# Patient Record
Sex: MALE | Race: WHITE
[De-identification: names, ages, dates, MRNs, and addresses within clinical notes are randomized per-mention and may not be internally consistent; named-entity substitution may affect disease eponyms.]

---

## 2017-02-21 ENCOUNTER — HOSPITAL ENCOUNTER (EMERGENCY)
Dept: HOSPITAL 47 - EC | Age: 42
Discharge: HOME | End: 2017-02-21
Payer: COMMERCIAL

## 2017-02-21 VITALS
HEART RATE: 70 BPM | SYSTOLIC BLOOD PRESSURE: 124 MMHG | RESPIRATION RATE: 20 BRPM | TEMPERATURE: 97.1 F | DIASTOLIC BLOOD PRESSURE: 60 MMHG

## 2017-02-21 DIAGNOSIS — M47.9: ICD-10-CM

## 2017-02-21 DIAGNOSIS — M54.16: Primary | ICD-10-CM

## 2017-02-21 DIAGNOSIS — F17.200: ICD-10-CM

## 2017-02-21 PROCEDURE — 99284 EMERGENCY DEPT VISIT MOD MDM: CPT

## 2017-02-21 PROCEDURE — 96372 THER/PROPH/DIAG INJ SC/IM: CPT

## 2017-02-21 PROCEDURE — 72100 X-RAY EXAM L-S SPINE 2/3 VWS: CPT

## 2017-02-21 NOTE — ED
Back Pain HPI





- General


Chief Complaint: Back Pain/Injury


Stated Complaint: Back Injury


Time Seen by Provider: 02/21/17 16:36


Source: patient, RN notes reviewed, old records reviewed





- History of Present Illness


Initial Comments: 





Patient is a 41-year-old male chief complaint of acute lower back pain after 

bending down to pick something up after getting out of bed.  Patient reports 

that his pain is mainly over his lower lumbar spine and right in the center.  

He denies any pain rating down towards his legs.  He also denies any saddle 

anesthesias or incontinence.  Patient reports that he has had similar pain like 

this before.  He is currently on the Habitrol shot for opiate dependence.  

Patient states that he is attempting to do passive stretches in the makes the 

pain worse.  He also has taken multiple Motrin 800 with little relief.





- Related Data


 Previous Rx's











 Medication  Instructions  Recorded


 


Cyclobenzaprine [Flexeril] 10 mg PO TID #30 tab 02/21/17


 


methylPREDNISolone Dose Pack 4 mg PO AS DIRECTED #21 package 02/21/17





[Medrol Dose Pack]  


 


traMADol HCL [Ultram] 50 mg PO Q4HR PRN #20 tab 02/21/17











 Allergies











Allergy/AdvReac Type Severity Reaction Status Date / Time


 


No Known Allergies Allergy   Verified 02/21/17 16:35














Review of Systems


ROS Statement: 


Those systems with pertinent positive or pertinent negative responses have been 

documented in the HPI.





ROS Other: All systems not noted in ROS Statement are negative.





Past Medical History


Past Medical History: No Reported History


History of Any Multi-Drug Resistant Organisms: None Reported


Past Surgical History: Cholecystectomy


Additional Past Surgical History / Comment(s): retinal surgery


Past Psychological History: No Psychological Hx Reported


Smoking Status: Current every day smoker


Past Alcohol Use History: None Reported


Past Drug Use History: None Reported





General Exam





- General Exam Comments


Initial Comments: 





Patient is a 41-year-old male.  He does not appear to be in any acute distress.

  Patient is sitting upright in bed.


General appearance: alert, in no apparent distress


Head exam: Present: atraumatic, normocephalic, normal inspection


Eye exam: Present: normal appearance, PERRL, EOMI.  Absent: scleral icterus, 

conjunctival injection, periorbital swelling


ENT exam: Present: normal exam, mucous membranes moist


Neck exam: Present: normal inspection.  Absent: tenderness, meningismus, 

lymphadenopathy


Respiratory exam: Present: normal lung sounds bilaterally.  Absent: respiratory 

distress, wheezes, rales, rhonchi, stridor


Cardiovascular Exam: Present: regular rate, normal rhythm, normal heart sounds.

  Absent: systolic murmur, diastolic murmur, rubs, gallop, clicks


GI/Abdominal exam: Present: soft, normal bowel sounds.  Absent: distended, 

tenderness, guarding, rebound, rigid


Extremities exam: Present: normal inspection, full ROM, normal capillary 

refill.  Absent: tenderness, pedal edema, joint swelling, calf tenderness


Back exam: Present: normal inspection, tenderness (lumbar tenderness)


Neurological exam: Present: alert, oriented X3, CN II-XII intact


Psychiatric exam: Present: normal affect, normal mood


Skin exam: Present: warm, dry, intact, normal color.  Absent: rash





Course


 Vital Signs











  02/21/17





  15:42


 


Temperature 97.1 F L


 


Pulse Rate 70


 


Respiratory 20





Rate 


 


Blood Pressure 124/60


 


O2 Sat by Pulse 98





Oximetry 














Medical Decision Making





- Medical Decision Making





Patient is a 41-year-old male with a chief complaint of acute onset of lower 

back pain after twisting motion today.  Patient is currently on birth control 

shot for opiate dependence.  Patient will be given IM injections of Solu-Medrol

, Toradol, and Norflex.  Lumbar spine x-rays obtained. Xrays show multilevel 

spondylosis. Patient given Rx for flexeril, tramadol, and referral for 

orthopedic specialist. Patient understands he needs to follow up with PCP. 

Understands treatment plan and will comply. Return parameters discussed.





Disposition


Clinical Impression: 


 Lumbar spondylosis, Acute back pain





Disposition: HOME SELF-CARE


Condition: Good


Instructions:  Acute Low Back Pain (ED), Lumbar Radiculopathy (ED)


Additional Instructions: 


Patient rest, ice, and apply heat to lower back. Advised to follow up with back 

specialist. Return to the emergency department if alarming signs or symptoms 

occur. 


Prescriptions: 


Cyclobenzaprine [Flexeril] 10 mg PO TID #30 tab


methylPREDNISolone Dose Pack [Medrol Dose Pack] 4 mg PO AS DIRECTED #21 package


traMADol HCL [Ultram] 50 mg PO Q4HR PRN #20 tab


 PRN Reason: Pain


Referrals: 


VIOLET Mendez DO [Doctor of Osteopathic Medicine] - 1-2 days


Time of Disposition: 17:26

## 2017-05-06 ENCOUNTER — HOSPITAL ENCOUNTER (EMERGENCY)
Dept: HOSPITAL 47 - EC | Age: 42
Discharge: HOME | End: 2017-05-06
Payer: COMMERCIAL

## 2017-05-06 VITALS
TEMPERATURE: 98.1 F | SYSTOLIC BLOOD PRESSURE: 135 MMHG | RESPIRATION RATE: 20 BRPM | DIASTOLIC BLOOD PRESSURE: 87 MMHG | HEART RATE: 84 BPM

## 2017-05-06 DIAGNOSIS — M54.9: ICD-10-CM

## 2017-05-06 DIAGNOSIS — Z88.0: ICD-10-CM

## 2017-05-06 DIAGNOSIS — Z76.0: ICD-10-CM

## 2017-05-06 DIAGNOSIS — G89.29: Primary | ICD-10-CM

## 2017-05-06 DIAGNOSIS — F17.200: ICD-10-CM

## 2017-05-06 PROCEDURE — 99282 EMERGENCY DEPT VISIT SF MDM: CPT

## 2017-05-06 NOTE — ED
General Adult HPI





- General


Chief complaint: Back Pain/Injury


Stated complaint: Back Pain/Med Refill


Time Seen by Provider: 05/06/17 16:40


Source: patient, RN notes reviewed


Mode of arrival: ambulatory


Limitations: no limitations





- History of Present Illness


Initial comments: 





41-year-old male presents emergency Department chief complaint of medication 

refill.  Patient states that his family doctor.  Does not have Xanax about 3 

days now.  Patient states he is concerned about withdrawals.  Patient states he 

also suffers from chronic back pain suffered injections.  He states that his 

back seems to be worsening as well as to have an appointment for his next 

injection this week.  Patient states just something to help him with the pain 

and with Xanax.  Patient denies any new or changing symptoms.  Patient states 

that pain is much like his chronic back pain has started flaring up due to the 

fact he has not had the treatment. Patient denies any recent fever, chills, 

shortness of breath, chest pain,  abdominal pain, nausea vomiting, numbness or 

tingling, dysuria or hematuria, constipation or diarrhea, headaches or visual 

changes, or any other current symptoms.





- Related Data


 Previous Rx's











 Medication  Instructions  Recorded


 


Cyclobenzaprine [Flexeril] 10 mg PO TID #30 tab 02/21/17


 


methylPREDNISolone Dose Pack 4 mg PO AS DIRECTED #21 package 02/21/17





[Medrol Dose Pack]  


 


traMADol HCL [Ultram] 50 mg PO Q4HR PRN #20 tab 02/21/17


 


ALPRAZolam [Xanax] 1 mg PO DAILY #10 tab 05/06/17


 


Orphenadrine [Norflex] 100 mg PO Q12H #10 tablet.er 05/06/17











 Allergies











Allergy/AdvReac Type Severity Reaction Status Date / Time


 


Penicillins Allergy  Unknown Verified 05/06/17 16:29














Review of Systems


ROS Statement: 


Those systems with pertinent positive or pertinent negative responses have been 

documented in the HPI.





ROS Other: All systems not noted in ROS Statement are negative.





Past Medical History


Past Medical History: No Reported History


Additional Past Medical History / Comment(s): chronic back pain


History of Any Multi-Drug Resistant Organisms: None Reported


Past Surgical History: Cholecystectomy


Additional Past Surgical History / Comment(s): retinal surgery


Past Psychological History: No Psychological Hx Reported


Smoking Status: Current every day smoker


Past Alcohol Use History: None Reported


Past Drug Use History: None Reported





General Exam


Limitations: no limitations


General appearance: alert, in no apparent distress


Head exam: Present: atraumatic, normocephalic, normal inspection


Neck exam: Present: normal inspection.  Absent: tenderness, meningismus, 

lymphadenopathy


Respiratory exam: Present: normal lung sounds bilaterally.  Absent: respiratory 

distress, wheezes, rales, rhonchi, stridor


Cardiovascular Exam: Present: regular rate, normal rhythm, normal heart sounds.

  Absent: systolic murmur, diastolic murmur, rubs, gallop, clicks


Back exam: Present: normal inspection.  Absent: muscle spasm, paraspinal 

tenderness, vertebral tenderness


Neurological exam: Present: alert, oriented X3


Psychiatric exam: Present: normal affect, normal mood


Skin exam: Present: warm, dry, intact, normal color.  Absent: rash





Course





 Vital Signs











  05/06/17





  16:27


 


Temperature 98.1 F


 


Pulse Rate 84


 


Respiratory 20





Rate 


 


Blood Pressure 135/87


 


O2 Sat by Pulse 99





Oximetry 














Medical Decision Making





- Medical Decision Making





41-year-old male presents for medication refill.  This time a maps was from 

that fit with the patient's regular given 10 Xanax to help him with his 

symptoms.  We discussed also given Norflex for the back pain.  We will give him 

follow-up to family care doctor as well as a back specialist.  We discussed 

return parameters on the patient's questions.  He states that he understood and 

he is in agreement with plan.  He will be discharged.





Disposition


Clinical Impression: 


 Chronic back pain, Medication refill





Disposition: HOME SELF-CARE


Condition: Stable


Instructions:  Chronic Back Pain (ED)


Additional Instructions: 


Please use medication as discussed. Please follow up with family doctor if 

symptoms have not improved over the next two days. Please return to the 

emergency room if your symptoms increase or worsen or for any other concerns. 


Prescriptions: 


ALPRAZolam [Xanax] 1 mg PO DAILY #10 tab


Orphenadrine [Norflex] 100 mg PO Q12H #10 tablet.er


Referrals: 


None,Stated [Primary Care Provider] - 1-2 days


Antwan Portillo MD [STAFF PHYSICIAN] - 1-2 days


VIOLET Mendez DO [Doctor of Osteopathic Medicine] - 1-2 days


Time of Disposition: 16:58

## 2017-06-19 ENCOUNTER — HOSPITAL ENCOUNTER (EMERGENCY)
Dept: HOSPITAL 47 - EC | Age: 42
Discharge: HOME | End: 2017-06-19
Payer: COMMERCIAL

## 2017-06-19 VITALS
SYSTOLIC BLOOD PRESSURE: 126 MMHG | TEMPERATURE: 98.3 F | DIASTOLIC BLOOD PRESSURE: 80 MMHG | HEART RATE: 69 BPM | RESPIRATION RATE: 18 BRPM

## 2017-06-19 DIAGNOSIS — F17.200: ICD-10-CM
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
## 2017-06-19 DIAGNOSIS — S39.012A: Primary | ICD-10-CM
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
## 2017-06-19 DIAGNOSIS — Z79.899: ICD-10-CM

## 2017-06-19 DIAGNOSIS — Z88.0: ICD-10-CM

## 2017-06-19 PROCEDURE — 96375 TX/PRO/DX INJ NEW DRUG ADDON: CPT

## 2017-06-19 PROCEDURE — 96374 THER/PROPH/DIAG INJ IV PUSH: CPT

## 2017-06-19 PROCEDURE — 99284 EMERGENCY DEPT VISIT MOD MDM: CPT

## 2017-06-19 NOTE — ED
Back Pain HPI





- General


Chief Complaint: Back Pain/Injury


Stated Complaint: Back Injury


Time Seen by Provider: 06/19/17 10:21


Source: patient, EMS, RN notes reviewed


Limitations: no limitations





- History of Present Illness


Initial Comments: 





41-year-old male presents emergency Department with chief complaint of back 

pain.  Patient states he has chronic back pain states he has had problems with 

his distant past.  Patient states he has been having a sore back but states 

that 2 days ago he was moving some stuff from his house and states that he had 

increase in back pain.  Patient states he may have felt a pop.  Patient denies 

any bowel, bladder incontinence or retention.  Denies any pain that radiates 

down his legs denies any saddle anesthesias denies any lower extremity 

paresthesias.  He states the pain is just so bad that he feels that he does not 

want to move because it increases his pain.  Patient's pain is better at rest.  

Patient states she did call his primary care physician who wanted to come in to 

be scheduled for imaging.  Patient had x-rays in the past.  Patient states he 

does not believe she's had an MRI.  Patient denies dysuria, hematuria, fever, 

chills, chest pain, shortness breath, nausea vomiting.  Patient did receive 

morphine from EMS and states that helped his pain.





- Related Data


 Home Medications











 Medication  Instructions  Recorded  Confirmed


 


Dextroamphetamine/Amphetamine 20 mg PO BID 06/19/17 06/19/17





[Adderall]   


 


clonazePAM [KlonoPIN] 1 mg PO BID 06/19/17 06/19/17








 Previous Rx's











 Medication  Instructions  Recorded


 


Cyclobenzaprine [Flexeril] 10 mg PO TID PRN #15 tab 06/19/17


 


Hydrocodone/Acetaminophen [Norco 1 tab PO Q6HR PRN #20 tab 06/19/17





5-325]  


 


methylPREDNISolone [Medrol Dose 4 mg PO AS DIRECTED #1 pack 06/19/17





Pack]  











 Allergies











Allergy/AdvReac Type Severity Reaction Status Date / Time


 


Penicillins Allergy  Unknown Verified 06/19/17 10:17














Review of Systems


ROS Statement: 


Those systems with pertinent positive or pertinent negative responses have been 

documented in the HPI.





ROS Other: All systems not noted in ROS Statement are negative.





Past Medical History


Past Medical History: No Reported History


Additional Past Medical History / Comment(s): chronic back pain


History of Any Multi-Drug Resistant Organisms: None Reported


Past Surgical History: Cholecystectomy


Additional Past Surgical History / Comment(s): retinal surgery


Past Psychological History: No Psychological Hx Reported


Smoking Status: Current every day smoker


Past Alcohol Use History: None Reported


Past Drug Use History: None Reported





General Exam


Limitations: no limitations


General appearance: alert, in no apparent distress


Head exam: Present: atraumatic, normocephalic, normal inspection


Respiratory exam: Present: normal lung sounds bilaterally.  Absent: respiratory 

distress, wheezes, rales, rhonchi, stridor


Cardiovascular Exam: Present: regular rate, normal rhythm, normal heart sounds.

  Absent: systolic murmur, diastolic murmur, rubs, gallop, clicks


GI/Abdominal exam: Present: soft, normal bowel sounds.  Absent: distended, 

tenderness, guarding, rebound, rigid


Extremities exam: Present: other (Lower extremity strength equal bilaterally, 

pedal pulses equal bilaterally, equal color equal warmth)


Back exam: Present: full ROM (With moderate discomfort), tenderness (Diffuse 

low back tenderness), muscle spasm, paraspinal tenderness.  Absent: vertebral 

tenderness


Neurological exam: Present: alert, oriented X3, CN II-XII intact, reflexes 

normal.  Absent: motor sensory deficit


Skin exam: Present: warm, dry, intact, normal color.  Absent: rash





Course


 Vital Signs











  06/19/17





  10:07


 


Temperature 98.3 F


 


Pulse Rate 69


 


Respiratory 18





Rate 


 


Blood Pressure 126/80


 


O2 Sat by Pulse 98





Oximetry 














Medical Decision Making





- Medical Decision Making





41-year-old male presents emergency Department with chief complaint of low back 

pain.  Patient has chronic low back pain.  Patient had injury secondary to 

lifting most likely a muscular strain.  Patient has no red flecks symptoms no 

neurological deficits.  Patient advised to follow up with PCP for MRI return 

parameters were discussed.





Disposition


Clinical Impression: 


 Lumbar strain





Disposition: HOME SELF-CARE


Condition: Stable


Instructions:  Acute Low Back Pain (ED)


Additional Instructions: 


Please return to the Emergency Department if symptoms worsen or any other 

concerns.


Prescriptions: 


Cyclobenzaprine [Flexeril] 10 mg PO TID PRN #15 tab


 PRN Reason: Muscle Spasm


Hydrocodone/Acetaminophen [Norco 5-325] 1 tab PO Q6HR PRN #20 tab


 PRN Reason: Pain


methylPREDNISolone [Medrol Dose Pack] 4 mg PO AS DIRECTED #1 pack


Referrals: 


Martín Bang MD [Primary Care Provider] - 1-2 days


Time of Disposition: 10:32

## 2017-07-02 ENCOUNTER — HOSPITAL ENCOUNTER (EMERGENCY)
Dept: HOSPITAL 47 - EC | Age: 42
Discharge: HOME | End: 2017-07-02
Payer: COMMERCIAL

## 2017-07-02 VITALS — DIASTOLIC BLOOD PRESSURE: 69 MMHG | RESPIRATION RATE: 18 BRPM | HEART RATE: 100 BPM | SYSTOLIC BLOOD PRESSURE: 112 MMHG

## 2017-07-02 VITALS — TEMPERATURE: 99.1 F

## 2017-07-02 DIAGNOSIS — Z88.0: ICD-10-CM

## 2017-07-02 DIAGNOSIS — T40.1X1A: Primary | ICD-10-CM

## 2017-07-02 DIAGNOSIS — F17.200: ICD-10-CM
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
## 2017-07-02 DIAGNOSIS — S39.012A: ICD-10-CM

## 2017-07-02 DIAGNOSIS — Z79.899: ICD-10-CM

## 2017-07-02 PROCEDURE — 96374 THER/PROPH/DIAG INJ IV PUSH: CPT

## 2017-07-02 PROCEDURE — 96375 TX/PRO/DX INJ NEW DRUG ADDON: CPT

## 2017-07-02 PROCEDURE — 99284 EMERGENCY DEPT VISIT MOD MDM: CPT

## 2017-07-02 NOTE — ED
General Adult HPI





- General


Chief complaint: Overdose


Stated complaint: overdose


Time Seen by Provider: 07/02/17 01:29


Source: patient, RN notes reviewed, old records reviewed


Mode of arrival: EMS


Limitations: no limitations





- History of Present Illness


Initial comments: 





This is a 5-year-old male the ER for evaluation regarding her overdose, heroin 

overdose.  Patient had heroin overdose tonight an attempt to relieve chronic 

back pain.  Patient occasionally will take Norco that he has find around town, 

he was unable to get his hands on that he was able to take Norco tonight.  At 

this time patient was given Narcan by EMS but ER complaining of back pain but 

otherwise awake and alert.  Denies any other drug or alcohol tonight





- Related Data


 Home Medications











 Medication  Instructions  Recorded  Confirmed


 


Dextroamphetamine/Amphetamine 20 mg PO BID 06/19/17 07/02/17





[Adderall]   


 


clonazePAM [KlonoPIN] 1 mg PO BID 06/19/17 07/02/17


 


Gabapentin [Neurontin] 800 mg PO TID 07/02/17 07/02/17











 Allergies











Allergy/AdvReac Type Severity Reaction Status Date / Time


 


Penicillins Allergy  Unknown Verified 06/19/17 10:17














Review of Systems


ROS Statement: 


Those systems with pertinent positive or pertinent negative responses have been 

documented in the HPI.





ROS Other: All systems not noted in ROS Statement are negative.





Past Medical History


Past Medical History: No Reported History


Additional Past Medical History / Comment(s): chronic back pain


History of Any Multi-Drug Resistant Organisms: None Reported


Past Surgical History: Cholecystectomy


Additional Past Surgical History / Comment(s): retinal surgery


Past Psychological History: No Psychological Hx Reported


Smoking Status: Current every day smoker


Past Alcohol Use History: None Reported, Occasional


Past Drug Use History: Heroin, Marijuana





General Exam


Limitations: no limitations


General appearance: alert, in no apparent distress, anxious


Head exam: Present: atraumatic, normocephalic, normal inspection


Eye exam: Present: normal appearance, PERRL, EOMI.  Absent: scleral icterus, 

conjunctival injection, periorbital swelling


ENT exam: Present: normal exam, mucous membranes moist


Neck exam: Present: normal inspection.  Absent: tenderness, meningismus, 

lymphadenopathy


Respiratory exam: Present: normal lung sounds bilaterally.  Absent: respiratory 

distress, wheezes, rales, rhonchi, stridor


Cardiovascular Exam: Present: normal rhythm, tachycardia, normal heart sounds.  

Absent: systolic murmur, diastolic murmur, rubs, gallop, clicks


GI/Abdominal exam: Present: soft, normal bowel sounds.  Absent: distended, 

tenderness, guarding, rebound, rigid


Extremities exam: Present: normal inspection, full ROM, normal capillary 

refill.  Absent: tenderness, pedal edema, joint swelling, calf tenderness


Back exam: Present: normal inspection


Neurological exam: Present: alert, oriented X3, CN II-XII intact


Psychiatric exam: Present: normal affect, normal mood


Skin exam: Present: warm, dry, intact, normal color.  Absent: rash





Course


 Vital Signs











  07/02/17





  01:37


 


Temperature 99.1 F


 


Pulse Rate 106 H


 


Respiratory 20





Rate 


 


Blood Pressure 117/78


 


O2 Sat by Pulse 94 L





Oximetry 














- Reevaluation(s)


Reevaluation #1: 





07/02/17 02:04


Patient's awake, alert, still complaining of back pain.





Medical Decision Making





- Medical Decision Making





[facility] for evaluation of heroin overdose, chronic back pain.  Patient will 

follow up with primary care regarding further evaluation and management.





Disposition


Clinical Impression: 


 Drug overdose, Poisoning by opiate or related narcotic, Lumbar strain





Disposition: HOME SELF-CARE


Condition: Good


Instructions:  Opioid Overdose (ED)


Referrals: 


Martín Bang MD [Primary Care Provider] - 1-2 days

## 2017-07-24 ENCOUNTER — HOSPITAL ENCOUNTER (OUTPATIENT)
Dept: HOSPITAL 47 - RADMRIMAIN | Age: 42
Discharge: HOME | End: 2017-07-24
Payer: COMMERCIAL

## 2017-07-24 DIAGNOSIS — M40.46: ICD-10-CM

## 2017-07-24 DIAGNOSIS — M47.816: Primary | ICD-10-CM

## 2017-07-24 PROCEDURE — 72158 MRI LUMBAR SPINE W/O & W/DYE: CPT

## 2017-07-24 NOTE — MR
EXAMINATION TYPE: MR lumbar spine wo/w con

 

DATE OF EXAM: 7/24/2017

 

COMPARISON: Lumbar spine x-ray February 21, 2017

 

HISTORY: Lumbar pain for order. Back pain for many years with 2 slipped discs increasing in severity 
per patient. Pain radiates to right lower extremity per patient.

 

TECHNIQUE: 

Multiplanar, multisequence images of the lumbar spine is performed without and with IV contrast, util
izing 20 mL intravenous MultiHance 

 

FINDINGS: Sagittal images of the lumbar spine show vertebral body heights to appear satisfactory. The
re is loss of normal lumbar lordosis. Multilevel Schmorl nodes are seen. There is slight dextroconvex
 scoliotic curvature centered in the mid to lower lumbar spine redemonstrated. There is slight grade 
1 retrolisthesis of L4 on L5 redemonstrated. There is multilevel disc desiccation with mild to modera
te disc space narrowing and spurring.  The conus medullaris is normal in position and signal ending a
t mid L1 level. There are heterogeneous endplate changes most prominent in anterior L4-L5 level with 
increased T1 and T2 signal consistent with Modic type II degenerative change. There is heterogeneous 
diminished T1 signal and enhancement centered L5-S1 disc space without disc enhancement. Posterior 2 
disc space extending inferiorly there is septated enhancement abutting posterior wall of S1r measurin
g 1.4 x 1.1 cm on sagittal image 6.

 

Axial images beginning at T12-L1 level which is within normal limits.

 

Axial images at L1-L2 level are felt also within normal limits.

 

Axial images at L2-L3 level show mild broad disc bulge mildly effacing anterior thecal sac on axial i
mage 18, bilateral neural foramina are patent.

 

Axial images at L3-L4 level show mild to moderate broad disc bulge mildly effacing anterior thecal sa
c and axial image 13. There is asymmetric mild to moderate left-sided anterior inferior neural forami
nal narrowing. Right-sided neural foramen is patent.

 

Axial images at L4-L5 level show moderate to severe broad disc bulge with slightly more prominent rig
ht lateral disc protrusion component. There is mild facet degenerative changes bilaterally. There is 
mild effacement anterior thecal sac on axial image 8. There is mild bilateral anterior inferior neura
l foraminal narrowing at this level identified. Encroachment along the anterior margin of right L4 ne
rve is difficult to exclude on sagittal image 13 on axial image 8.

 

Axial images at L5-S1 level show suspected disc extrusion seen best sagittal image 7. There is extra 
medullary extradural T2 hyperintense lesion with peripheral enhancement worrisome for small epidural 
abscess at this level. Clinical and lab correlation advised. Some effacement of spinal canal is seen.
 There is likely mass effect on Central left S1 nerve. No suspicious ossific destruction is seen curr
ently. Enhancement extends towards the left neural foramina. There is mild to moderate bilateral face
t arthropathy at this level.

 

IMPRESSION: Loss of normal lumbar lordosis with multilevel degenerative changes seen as detailed abov
e quite prominent for patient's chronologic age. There is rim enhancing extra medullary extradural le
katelynn posterior S1 level appears to extend from disc space worrisome for epidural abscess. Clinical an
d lab correlation advised.

 

A Yellow message has been communicated to Ambar Donato MD via the [employer] system on 7/24/2017 5:02 PM, Message ID [id number].

## 2018-04-08 ENCOUNTER — HOSPITAL ENCOUNTER (EMERGENCY)
Dept: HOSPITAL 47 - EC | Age: 43
Discharge: HOME | End: 2018-04-08
Payer: COMMERCIAL

## 2018-04-08 VITALS — TEMPERATURE: 98.1 F

## 2018-04-08 VITALS — HEART RATE: 54 BPM | RESPIRATION RATE: 16 BRPM | DIASTOLIC BLOOD PRESSURE: 80 MMHG | SYSTOLIC BLOOD PRESSURE: 116 MMHG

## 2018-04-08 DIAGNOSIS — Z90.49: ICD-10-CM

## 2018-04-08 DIAGNOSIS — F17.200: ICD-10-CM

## 2018-04-08 DIAGNOSIS — R10.31: Primary | ICD-10-CM

## 2018-04-08 DIAGNOSIS — Z79.899: ICD-10-CM

## 2018-04-08 DIAGNOSIS — Z88.0: ICD-10-CM

## 2018-04-08 LAB
ALBUMIN SERPL-MCNC: 3.6 G/DL (ref 3.5–5)
ALP SERPL-CCNC: 61 U/L (ref 38–126)
ALT SERPL-CCNC: 58 U/L (ref 21–72)
ANION GAP SERPL CALC-SCNC: 7 MMOL/L
AST SERPL-CCNC: 52 U/L (ref 17–59)
BASOPHILS # BLD AUTO: 0 K/UL (ref 0–0.2)
BASOPHILS NFR BLD AUTO: 0 %
BUN SERPL-SCNC: 19 MG/DL (ref 9–20)
CALCIUM SPEC-MCNC: 9.1 MG/DL (ref 8.4–10.2)
CHLORIDE SERPL-SCNC: 104 MMOL/L (ref 98–107)
CO2 SERPL-SCNC: 29 MMOL/L (ref 22–30)
EOSINOPHIL # BLD AUTO: 0.4 K/UL (ref 0–0.7)
EOSINOPHIL NFR BLD AUTO: 7 %
ERYTHROCYTE [DISTWIDTH] IN BLOOD BY AUTOMATED COUNT: 4.41 M/UL (ref 4.3–5.9)
ERYTHROCYTE [DISTWIDTH] IN BLOOD: 14.4 % (ref 11.5–15.5)
GLUCOSE SERPL-MCNC: 103 MG/DL (ref 74–99)
HCT VFR BLD AUTO: 39.2 % (ref 39–53)
HGB BLD-MCNC: 12.7 GM/DL (ref 13–17.5)
LYMPHOCYTES # SPEC AUTO: 1.5 K/UL (ref 1–4.8)
LYMPHOCYTES NFR SPEC AUTO: 27 %
MCH RBC QN AUTO: 28.8 PG (ref 25–35)
MCHC RBC AUTO-ENTMCNC: 32.4 G/DL (ref 31–37)
MCV RBC AUTO: 88.9 FL (ref 80–100)
MONOCYTES # BLD AUTO: 0.4 K/UL (ref 0–1)
MONOCYTES NFR BLD AUTO: 7 %
NEUTROPHILS # BLD AUTO: 3.1 K/UL (ref 1.3–7.7)
NEUTROPHILS NFR BLD AUTO: 56 %
PLATELET # BLD AUTO: 183 K/UL (ref 150–450)
POTASSIUM SERPL-SCNC: 4.8 MMOL/L (ref 3.5–5.1)
PROT SERPL-MCNC: 7 G/DL (ref 6.3–8.2)
SODIUM SERPL-SCNC: 140 MMOL/L (ref 137–145)
WBC # BLD AUTO: 5.5 K/UL (ref 3.8–10.6)

## 2018-04-08 PROCEDURE — 80053 COMPREHEN METABOLIC PANEL: CPT

## 2018-04-08 PROCEDURE — 85025 COMPLETE CBC W/AUTO DIFF WBC: CPT

## 2018-04-08 PROCEDURE — 74177 CT ABD & PELVIS W/CONTRAST: CPT

## 2018-04-08 PROCEDURE — 36415 COLL VENOUS BLD VENIPUNCTURE: CPT

## 2018-04-08 PROCEDURE — 99284 EMERGENCY DEPT VISIT MOD MDM: CPT

## 2018-04-08 NOTE — ED
General Adult HPI





- General


Chief complaint: Urogenital


Stated complaint: Groin Pain


Time Seen by Provider: 04/08/18 12:42


Source: patient, RN notes reviewed


Mode of arrival: ambulatory


Limitations: no limitations





- History of Present Illness


Initial comments: 





Patient 42-year-old male presented to the emergency wound today with a chief 

complaint of pain right lower quadrant.  He is worried about a hernia.  He 

states he's been feeling some discomfort last 3 weeks.  States worse with 

certain movements.  States first it noticeably was climbing up into a bunk bed.

  Patient states that his it's worse with certain movements of coughs or 

sneezes.  Patient denies any other complaints or symptoms.





- Related Data


 Home Medications











 Medication  Instructions  Recorded  Confirmed


 


Dextroamphetamine/Amphetamine 20 mg PO BID 06/19/17 07/02/17





[Adderall]   


 


clonazePAM [KlonoPIN] 1 mg PO BID 06/19/17 07/02/17


 


Gabapentin [Neurontin] 800 mg PO TID 07/02/17 07/02/17











 Allergies











Allergy/AdvReac Type Severity Reaction Status Date / Time


 


Penicillins Allergy  Unknown Verified 04/08/18 12:33














Review of Systems


ROS Statement: 


Those systems with pertinent positive or pertinent negative responses have been 

documented in the HPI.





ROS Other: All systems not noted in ROS Statement are negative.





Past Medical History


Past Medical History: No Reported History


Additional Past Medical History / Comment(s): chronic back pain


History of Any Multi-Drug Resistant Organisms: None Reported


Past Surgical History: Cholecystectomy


Additional Past Surgical History / Comment(s): retinal surgery


Past Psychological History: No Psychological Hx Reported


Smoking Status: Current every day smoker


Past Alcohol Use History: None Reported


Past Drug Use History: Heroin, Marijuana





General Exam





- General Exam Comments


Initial Comments: 





General:  The patient is awake and alert, in no distress, and does not appear 

acutely ill. 


Eye:  Pupils are equal, round and reactive to light, extra-ocular movements are 

intact.  No nystagmus.  There is normal conjunctiva bilaterally.  No signs of 

icterus.  


Ears, nose, mouth and throat:  There are moist mucous membranes and no oral 

lesions. 


Neck:  The neck is supple, there is no tenderness or JVD.  


Cardiovascular:  There is a regular rate and rhythm. No murmur, rub or gallop 

is appreciated.


Respiratory:  Lungs are clear to auscultation, respirations are non-labored, 

breath sounds are equal.  No wheezes, stridor, rales, or rhonchi.


Gastrointestinal:  Soft, non-distended.  Mild tenderness right lower quadrant.  

No specific mass or hernia is palpated. There is no rebound or guarding 

present.  No CVA tenderness. 


Musculoskeletal:  Normal ROM, no tenderness.  Strength 5/5. Sensation intact. 

Pulses equal bilaterally 2+.  


Neurological:  A&O x 3. CN II-XII intact, There are no obvious motor or sensory 

deficits. Coordination appears grossly intact. Speech is normal.


Skin:  Skin is warm and dry and no rashes or lesions are noted. 


Psychiatric:  Cooperative, appropriate mood & affect, normal judgment.  


Limitations: no limitations





Course


 Vital Signs











  04/08/18





  12:30


 


Temperature 98.1 F


 


Pulse Rate 66


 


Respiratory 18





Rate 


 


Blood Pressure 116/61


 


O2 Sat by Pulse 98





Oximetry 














Medical Decision Making





- Medical Decision Making





Labs been reviewed.  CT is reviewed and negative for any acute abnormalities.  

Was discussed with patient about possible muscle strain.  Denies use anti-

inflammatories.  Patient will be discharged home advised follow-up with her 

physician if symptoms persist and not improving.  Advised return if any 

symptoms increase or worsen.





- Lab Data


Result diagrams: 


 04/08/18 13:39





 04/08/18 13:39


 Lab Results











  04/08/18 04/08/18 Range/Units





  13:39 13:39 


 


WBC  5.5   (3.8-10.6)  k/uL


 


RBC  4.41   (4.30-5.90)  m/uL


 


Hgb  12.7 L   (13.0-17.5)  gm/dL


 


Hct  39.2   (39.0-53.0)  %


 


MCV  88.9   (80.0-100.0)  fL


 


MCH  28.8   (25.0-35.0)  pg


 


MCHC  32.4   (31.0-37.0)  g/dL


 


RDW  14.4   (11.5-15.5)  %


 


Plt Count  183   (150-450)  k/uL


 


Neutrophils %  56   %


 


Lymphocytes %  27   %


 


Monocytes %  7   %


 


Eosinophils %  7   %


 


Basophils %  0   %


 


Neutrophils #  3.1   (1.3-7.7)  k/uL


 


Lymphocytes #  1.5   (1.0-4.8)  k/uL


 


Monocytes #  0.4   (0-1.0)  k/uL


 


Eosinophils #  0.4   (0-0.7)  k/uL


 


Basophils #  0.0   (0-0.2)  k/uL


 


Sodium   140  (137-145)  mmol/L


 


Potassium   4.8  (3.5-5.1)  mmol/L


 


Chloride   104  ()  mmol/L


 


Carbon Dioxide   29  (22-30)  mmol/L


 


Anion Gap   7  mmol/L


 


BUN   19  (9-20)  mg/dL


 


Creatinine   0.75  (0.66-1.25)  mg/dL


 


Est GFR (CKD-EPI)AfAm   >90  (>60 ml/min/1.73 sqM)  


 


Est GFR (CKD-EPI)NonAf   >90  (>60 ml/min/1.73 sqM)  


 


Glucose   103 H  (74-99)  mg/dL


 


Calcium   9.1  (8.4-10.2)  mg/dL


 


Total Bilirubin   0.3  (0.2-1.3)  mg/dL


 


AST   52  (17-59)  U/L


 


ALT   58  (21-72)  U/L


 


Alkaline Phosphatase   61  ()  U/L


 


Total Protein   7.0  (6.3-8.2)  g/dL


 


Albumin   3.6  (3.5-5.0)  g/dL














Disposition


Clinical Impression: 


 Abdominal pain





Disposition: HOME SELF-CARE


Condition: Good


Instructions:  Abdominal Pain (ED)


Additional Instructions: 


Please use medication as discussed.  Please follow-up with family doctor in the 

next 2 days of symptoms have not improved.  Please return to emergency room if 

the symptoms increase or worsen or for any other concerns.


Referrals: 


None,Stated [Primary Care Provider] - 1-2 days


Bogdan Wheeler DO [STAFF PHYSICIAN] - 1-2 days


Ambar Donato MD [REFERRING] - 1-2 days


Time of Disposition: 14:42

## 2018-04-08 NOTE — CT
EXAMINATION TYPE: CT abdomen pelvis w con

 

DATE OF EXAM: 4/8/2018

 

COMPARISON: NONE

 

INDICATION: Groin Pain

 

DLP: 1075.10 mGycm, Automated exposure control for dose reduction was used.

 

CONTRAST:  100 ml mL of Isovue 300. 

                        Study performed without Oral Contrast

 

TECHNIQUE: Axial images were obtained from above the diaphragm to the pubic rami in the axial plane a
t 5 mm thick sections.  Reconstructed images are reviewed on the computer in the coronal plane.  

 

FINDINGS:

 

Limited CT sections are obtained the lung bases.  There is a 0.3 cm rounded density in the periphery 
of the right lower lobe medially. Series 4 image 8. Some minimal subsegmental atelectasis is in the p
osterior right base.

 

CT ABDOMEN:

 

Liver: Normal

 

Spleen: Normal

 

Pancreas: Normal

 

Adrenal glands: The adrenal glands are normal.

 

Gallbladder: Surgically absent  

 

Kidneys: No masses are evident. No hydronephrosis is present.   No cysts are present.  Delayed images
 were obtained through the kidneys, which remain unremarkable.

 

Aorta: Normal

 

Inferior vena cava: Normal.

 

CT PELVIS: 

Loops of bowel within the abdomen and pelvis are normal.     Studies without oral contrast limiting t
he evaluation.

 

Appendix: Normal as visualized.

 

Urinary bladder: Normal. 

 

Genitourinary structures: Prostate is unremarkable.

 

Osseous structures: No suspicious lytic or sclerotic lesions.

 

IMPRESSIONS:

1.  Unremarkable CT abdomen and pelvis